# Patient Record
Sex: FEMALE | Race: BLACK OR AFRICAN AMERICAN | NOT HISPANIC OR LATINO | Employment: PART TIME | URBAN - METROPOLITAN AREA
[De-identification: names, ages, dates, MRNs, and addresses within clinical notes are randomized per-mention and may not be internally consistent; named-entity substitution may affect disease eponyms.]

---

## 2019-01-21 ENCOUNTER — OFFICE VISIT (OUTPATIENT)
Dept: URGENT CARE | Facility: URGENT CARE | Age: 24
End: 2019-01-21
Payer: COMMERCIAL

## 2019-01-21 VITALS
DIASTOLIC BLOOD PRESSURE: 70 MMHG | SYSTOLIC BLOOD PRESSURE: 128 MMHG | HEART RATE: 65 BPM | OXYGEN SATURATION: 98 % | TEMPERATURE: 98.1 F

## 2019-01-21 DIAGNOSIS — R10.13 ABDOMINAL PAIN, EPIGASTRIC: Primary | ICD-10-CM

## 2019-01-21 LAB
ALBUMIN SERPL-MCNC: 4.4 G/DL (ref 3.4–5)
ALP SERPL-CCNC: 56 U/L (ref 40–150)
ALT SERPL W P-5'-P-CCNC: 16 U/L (ref 0–50)
AMYLASE SERPL-CCNC: 56 U/L (ref 30–110)
ANION GAP SERPL CALCULATED.3IONS-SCNC: 4 MMOL/L (ref 3–14)
AST SERPL W P-5'-P-CCNC: 10 U/L (ref 0–45)
BASOPHILS # BLD AUTO: 0 10E9/L (ref 0–0.2)
BASOPHILS NFR BLD AUTO: 0.6 %
BILIRUB SERPL-MCNC: 0.6 MG/DL (ref 0.2–1.3)
BUN SERPL-MCNC: 9 MG/DL (ref 7–30)
CALCIUM SERPL-MCNC: 9.1 MG/DL (ref 8.5–10.1)
CHLORIDE SERPL-SCNC: 105 MMOL/L (ref 94–109)
CO2 SERPL-SCNC: 31 MMOL/L (ref 20–32)
CREAT SERPL-MCNC: 0.6 MG/DL (ref 0.52–1.04)
DIFFERENTIAL METHOD BLD: ABNORMAL
EOSINOPHIL # BLD AUTO: 0 10E9/L (ref 0–0.7)
EOSINOPHIL NFR BLD AUTO: 0.8 %
ERYTHROCYTE [DISTWIDTH] IN BLOOD BY AUTOMATED COUNT: 13.4 % (ref 10–15)
GFR SERPL CREATININE-BSD FRML MDRD: >90 ML/MIN/{1.73_M2}
GLUCOSE SERPL-MCNC: 83 MG/DL (ref 70–99)
HCT VFR BLD AUTO: 41.9 % (ref 35–47)
HGB BLD-MCNC: 14 G/DL (ref 11.7–15.7)
LYMPHOCYTES # BLD AUTO: 1.4 10E9/L (ref 0.8–5.3)
LYMPHOCYTES NFR BLD AUTO: 38.1 %
MCH RBC QN AUTO: 30.5 PG (ref 26.5–33)
MCHC RBC AUTO-ENTMCNC: 33.4 G/DL (ref 31.5–36.5)
MCV RBC AUTO: 91 FL (ref 78–100)
MONOCYTES # BLD AUTO: 0.3 10E9/L (ref 0–1.3)
MONOCYTES NFR BLD AUTO: 8.1 %
NEUTROPHILS # BLD AUTO: 1.9 10E9/L (ref 1.6–8.3)
NEUTROPHILS NFR BLD AUTO: 52.4 %
PLATELET # BLD AUTO: 230 10E9/L (ref 150–450)
POTASSIUM SERPL-SCNC: 4.3 MMOL/L (ref 3.4–5.3)
PROT SERPL-MCNC: 8.2 G/DL (ref 6.8–8.8)
RBC # BLD AUTO: 4.59 10E12/L (ref 3.8–5.2)
SODIUM SERPL-SCNC: 140 MMOL/L (ref 133–144)
WBC # BLD AUTO: 3.6 10E9/L (ref 4–11)

## 2019-01-21 PROCEDURE — 80053 COMPREHEN METABOLIC PANEL: CPT | Performed by: FAMILY MEDICINE

## 2019-01-21 PROCEDURE — 85025 COMPLETE CBC W/AUTO DIFF WBC: CPT | Performed by: FAMILY MEDICINE

## 2019-01-21 PROCEDURE — 36415 COLL VENOUS BLD VENIPUNCTURE: CPT | Performed by: FAMILY MEDICINE

## 2019-01-21 PROCEDURE — 99204 OFFICE O/P NEW MOD 45 MIN: CPT | Performed by: FAMILY MEDICINE

## 2019-01-21 PROCEDURE — 82150 ASSAY OF AMYLASE: CPT | Performed by: FAMILY MEDICINE

## 2019-01-21 RX ORDER — OMEPRAZOLE 40 MG/1
40 CAPSULE, DELAYED RELEASE ORAL DAILY PRN
Qty: 30 CAPSULE | Refills: 0 | Status: SHIPPED | OUTPATIENT
Start: 2019-01-21 | End: 2019-02-20

## 2019-01-21 NOTE — PROGRESS NOTES
SUBJECTIVE  HPI:  Fang Rodriguez is a 23 year old female who presents with the CC of abdominal/pelvic pain.    Pain is located in the epigastric area, without radiation to the back.  The pain is characterized as burning, and at worst is a level 7 on a scale of 1-10.  Pain has been present for 5 day(s) and is fluctuating.  EXACERBATING FACTORS: eating  RELIEVING FACTORS: nothing.  ASSOCIATED SX: weakness.   On metformin for pcos but not new med and no new doses or manuf, etc.     No fhx of cholecystitis, colitis    No prior surgeries    Non smoker    Past Medical History:   Diagnosis Date     PCOS (polycystic ovarian syndrome)      Current Outpatient Medications   Medication Sig Dispense Refill     metFORMIN (GLUCOPHAGE) 500 MG tablet Take 1,000 mg by mouth 2 times daily (with meals)       Social History     Tobacco Use     Smoking status: Not on file   Substance Use Topics     Alcohol use: Not on file     ROS:  CONSTITUTIONAL:NEGATIVE for fever, chills, change in weight  INTEGUMENTARY/SKIN: NEGATIVE for worrisome rashes, moles or lesions  EYES: NEGATIVE for vision changes or irritation  ENT/MOUTH: NEGATIVE for ear, mouth and throat problems  RESP:NEGATIVE for significant cough or SOB  BREAST: NEGATIVE for masses, tenderness or discharge  CV: NEGATIVE for chest pain, palpitations or peripheral edema  : normal menstrual cycles  MUSCULOSKELETAL: NEGATIVE for significant arthralgias or myalgia  NEURO: NEGATIVE for weakness, dizziness or paresthesias  ENDOCRINE: NEGATIVE for temperature intolerance, skin/hair changes  HEME/ALLERGY/IMMUNE: NEGATIVE for bleeding problems  PSYCHIATRIC: NEGATIVE for changes in mood or affect    OBJECTIVE:  /70   Pulse 65   Temp 98.1  F (36.7  C) (Oral)   SpO2 98%   GENERAL APPEARANCE: healthy, alert and no distress  EYES: EOMI,  PERRL, conjunctiva clear  HENT: ear canals and TM's normal.  Nose and mouth without ulcers, erythema or lesions  NECK: supple, nontender, no  lymphadenopathy  RESP: lungs clear to auscultation - no rales, rhonchi or wheezes  CV: regular rates and rhythm, normal S1 S2, no murmur noted  ABDOMEN:  soft, mildly tender, no HSM or masses and bowel sounds normal  NEURO: Normal strength and tone, sensory exam grossly normal,  normal speech and mentation  SKIN: no suspicious lesions or rashes    ASSESSMENT:  1. Abdominal pain, epigastric  ddx includes gb, pancreatitis, colitis, gastritis, ulcer.   Labs neg    - CBC with platelets and differential  - Comprehensive metabolic panel (BMP + Alb, Alk Phos, ALT, AST, Total. Bili, TP)  - Amylase  - omeprazole (PRILOSEC) 40 MG DR capsule; Take 1 capsule (40 mg) by mouth daily as needed (abd pain/gerd)  Dispense: 30 capsule; Refill: 0   See back in 2 days   Pt instructed to come back to the clinic for worsening sx  Avoid spicy, acidic foods  See Orders in Epic

## 2019-06-17 ENCOUNTER — OFFICE VISIT (OUTPATIENT)
Dept: URGENT CARE | Facility: URGENT CARE | Age: 24
End: 2019-06-17

## 2019-06-17 VITALS
HEART RATE: 69 BPM | OXYGEN SATURATION: 100 % | SYSTOLIC BLOOD PRESSURE: 131 MMHG | DIASTOLIC BLOOD PRESSURE: 73 MMHG | TEMPERATURE: 98.6 F

## 2019-06-17 DIAGNOSIS — R53.81 MALAISE: Primary | ICD-10-CM

## 2019-06-17 DIAGNOSIS — M79.10 MYALGIA: ICD-10-CM

## 2019-06-17 PROCEDURE — 99213 OFFICE O/P EST LOW 20 MIN: CPT

## 2019-06-17 NOTE — PROGRESS NOTES
Pt came here for weakness, neck pain and also headache. Has been going on for the last 1 week. Pt also noted that she has fever overnight but not sure how high is her temp. She has not been traveling outside the country. No other concern    No Known Allergies    Past Medical History:   Diagnosis Date     PCOS (polycystic ovarian syndrome)          Current Outpatient Medications on File Prior to Visit:  metFORMIN (GLUCOPHAGE) 500 MG tablet Take 1,000 mg by mouth 2 times daily (with meals)   [] omeprazole (PRILOSEC) 40 MG DR capsule Take 1 capsule (40 mg) by mouth daily as needed (abd pain/gerd)     No current facility-administered medications on file prior to visit.     Social History     Tobacco Use     Smoking status: Not on file   Substance Use Topics     Alcohol use: Not on file       ROS:  12 point ROS is done and aside that mention above all other review of system is negative    OBJECTIVE:  /73   Pulse 69   Temp 98.6  F (37  C) (Oral)   SpO2 100%   GENERAL APPEARANCE: healthy, alert and moderate distress  EYES: conjunctiva clear  EARS:no cerumen.   Ear canals no erythema, TM's intact no erythema .    NOSE/MOUTH: Nose and mouth is normal, no erythema or lesions  THROAT: no erythema w/ no tonsillar enlargement . no exudates  NECK: supple, nontender, no lymphadenopathy  RESP: lungs clear to auscultation - no rales, rhonchi or wheezes  CV: regular rates and rhythm, normal S1 S2, no murmur noted  NEURO: awake, alert        No results found for this or any previous visit (from the past 168 hour(s)).     ASSESSMENT:     ICD-10-CM    1. Malaise R53.81    2. Myalgia M79.10          PLAN:    At this time I am not sure the cause of her symptom. Unfortunately I will not be able to do complete blood test in urgent care. This can also be viral issue. Recommend to try Tylenol and ibuprofen prn for  Pain or fever. If not improve will need further eval with pcp    Naren Gomez MD

## 2020-08-23 ENCOUNTER — HOSPITAL ENCOUNTER (EMERGENCY)
Facility: CLINIC | Age: 25
Discharge: HOME | End: 2020-08-23
Attending: EMERGENCY MEDICINE | Admitting: EMERGENCY MEDICINE

## 2020-08-23 ENCOUNTER — APPOINTMENT (OUTPATIENT)
Dept: ULTRASOUND IMAGING | Facility: CLINIC | Age: 25
End: 2020-08-23
Attending: EMERGENCY MEDICINE

## 2020-08-23 VITALS
OXYGEN SATURATION: 99 % | RESPIRATION RATE: 16 BRPM | TEMPERATURE: 97.3 F | SYSTOLIC BLOOD PRESSURE: 142 MMHG | BODY MASS INDEX: 26.64 KG/M2 | WEIGHT: 169.75 LBS | DIASTOLIC BLOOD PRESSURE: 80 MMHG | HEART RATE: 75 BPM | HEIGHT: 67 IN

## 2020-08-23 DIAGNOSIS — O20.0 THREATENED MISCARRIAGE: ICD-10-CM

## 2020-08-23 DIAGNOSIS — O30.001 TWIN GESTATION IN FIRST TRIMESTER, UNSPECIFIED MULTIPLE GESTATION TYPE: ICD-10-CM

## 2020-08-23 LAB
ABO + RH BLD: NORMAL
ABO + RH BLD: NORMAL
ALBUMIN SERPL-MCNC: 3.3 G/DL (ref 3.4–5)
ALP SERPL-CCNC: 62 U/L (ref 40–150)
ALT SERPL W P-5'-P-CCNC: 20 U/L (ref 0–50)
ANION GAP SERPL CALCULATED.3IONS-SCNC: 5 MMOL/L (ref 3–14)
APTT PPP: 30 SEC (ref 22–37)
AST SERPL W P-5'-P-CCNC: 12 U/L (ref 0–45)
B-HCG SERPL-ACNC: ABNORMAL IU/L (ref 0–5)
BASOPHILS # BLD AUTO: 0 10E9/L (ref 0–0.2)
BASOPHILS NFR BLD AUTO: 0.2 %
BILIRUB SERPL-MCNC: 0.7 MG/DL (ref 0.2–1.3)
BLD GP AB SCN SERPL QL: NORMAL
BLOOD BANK CMNT PATIENT-IMP: NORMAL
BUN SERPL-MCNC: 5 MG/DL (ref 7–30)
CALCIUM SERPL-MCNC: 8 MG/DL (ref 8.5–10.1)
CHLORIDE SERPL-SCNC: 108 MMOL/L (ref 94–109)
CO2 SERPL-SCNC: 23 MMOL/L (ref 20–32)
CREAT SERPL-MCNC: 0.56 MG/DL (ref 0.52–1.04)
DIFFERENTIAL METHOD BLD: NORMAL
EOSINOPHIL # BLD AUTO: 0.1 10E9/L (ref 0–0.7)
EOSINOPHIL NFR BLD AUTO: 1.8 %
ERYTHROCYTE [DISTWIDTH] IN BLOOD BY AUTOMATED COUNT: 13.1 % (ref 10–15)
GFR SERPL CREATININE-BSD FRML MDRD: >90 ML/MIN/{1.73_M2}
GLUCOSE SERPL-MCNC: 100 MG/DL (ref 70–99)
HCT VFR BLD AUTO: 39.4 % (ref 35–47)
HGB BLD-MCNC: 13.1 G/DL (ref 11.7–15.7)
IMM GRANULOCYTES # BLD: 0 10E9/L (ref 0–0.4)
IMM GRANULOCYTES NFR BLD: 0.4 %
INR PPP: 0.99 (ref 0.86–1.14)
LYMPHOCYTES # BLD AUTO: 1.2 10E9/L (ref 0.8–5.3)
LYMPHOCYTES NFR BLD AUTO: 20.8 %
MCH RBC QN AUTO: 30.3 PG (ref 26.5–33)
MCHC RBC AUTO-ENTMCNC: 33.2 G/DL (ref 31.5–36.5)
MCV RBC AUTO: 91 FL (ref 78–100)
MONOCYTES # BLD AUTO: 0.4 10E9/L (ref 0–1.3)
MONOCYTES NFR BLD AUTO: 7.8 %
NEUTROPHILS # BLD AUTO: 3.9 10E9/L (ref 1.6–8.3)
NEUTROPHILS NFR BLD AUTO: 69 %
NRBC # BLD AUTO: 0 10*3/UL
NRBC BLD AUTO-RTO: 0 /100
PLATELET # BLD AUTO: 227 10E9/L (ref 150–450)
POTASSIUM SERPL-SCNC: 3.7 MMOL/L (ref 3.4–5.3)
PROT SERPL-MCNC: 7.1 G/DL (ref 6.8–8.8)
RBC # BLD AUTO: 4.32 10E12/L (ref 3.8–5.2)
SODIUM SERPL-SCNC: 136 MMOL/L (ref 133–144)
SPECIMEN EXP DATE BLD: NORMAL
WBC # BLD AUTO: 5.7 10E9/L (ref 4–11)

## 2020-08-23 PROCEDURE — 99284 EMERGENCY DEPT VISIT MOD MDM: CPT | Mod: 25

## 2020-08-23 PROCEDURE — 85730 THROMBOPLASTIN TIME PARTIAL: CPT | Performed by: EMERGENCY MEDICINE

## 2020-08-23 PROCEDURE — 84702 CHORIONIC GONADOTROPIN TEST: CPT | Performed by: EMERGENCY MEDICINE

## 2020-08-23 PROCEDURE — 86900 BLOOD TYPING SEROLOGIC ABO: CPT | Performed by: EMERGENCY MEDICINE

## 2020-08-23 PROCEDURE — 85610 PROTHROMBIN TIME: CPT | Performed by: EMERGENCY MEDICINE

## 2020-08-23 PROCEDURE — 76817 TRANSVAGINAL US OBSTETRIC: CPT

## 2020-08-23 PROCEDURE — 86901 BLOOD TYPING SEROLOGIC RH(D): CPT | Performed by: EMERGENCY MEDICINE

## 2020-08-23 PROCEDURE — 80053 COMPREHEN METABOLIC PANEL: CPT | Performed by: EMERGENCY MEDICINE

## 2020-08-23 PROCEDURE — 85025 COMPLETE CBC W/AUTO DIFF WBC: CPT | Performed by: EMERGENCY MEDICINE

## 2020-08-23 PROCEDURE — 86850 RBC ANTIBODY SCREEN: CPT | Performed by: EMERGENCY MEDICINE

## 2020-08-23 ASSESSMENT — ENCOUNTER SYMPTOMS
FEVER: 0
CHILLS: 0
DIFFICULTY URINATING: 0
SLEEP DISTURBANCE: 1
COUGH: 0
DYSURIA: 0
FREQUENCY: 0
VOMITING: 0
ABDOMINAL PAIN: 0
SHORTNESS OF BREATH: 0
DIARRHEA: 0
NAUSEA: 0

## 2020-08-23 ASSESSMENT — MIFFLIN-ST. JEOR: SCORE: 1537.63

## 2020-08-23 NOTE — ED AVS SNAPSHOT
Emergency Department  [address] [place] [id number]  Phone:  [phone]  Fax:  [phone]                                    Fang Rodriguez   MRN: [id number]    Department:   Emergency Department   Date of Visit:  8/23/2020           After Visit Summary Signature Page    I have received my discharge instructions, and my questions have been answered. I have discussed any challenges I see with this plan with the nurse or doctor.    ..........................................................................................................................................  Patient/Patient Representative Signature      ..........................................................................................................................................  Patient Representative Print Name and Relationship to Patient    ..................................................               ................................................  Date                                   Time    ..........................................................................................................................................  Reviewed by Signature/Title    ...................................................              ..............................................  Date                                               Time          22EPIC Rev 08/18

## 2020-08-23 NOTE — ED PROVIDER NOTES
History     Chief Complaint:  Vaginal Bleeding     The history is provided by the patient.     Fang Rodriguez is a 25 year old year old female, believed to be about 6 weeks pregnant, with a history of PCOS who presents with her significant other for evaluation of vaginal bleeding for the last three days. At first, the patient reports that it started out as brown spotting. She had contacted her OB and they said that this was normal and should resolve on its own. That night, the spotting had just about stopped. Two days ago, the patient noticed the spotting again, but this time it was pink in color. She once again called her OB who said she should not be too concerned and that it would go away. Patient noticed an improvement in her spotting yesterday during the day. Last night, however, the pink spotting returned. This morning, about 4 hours prior to arrival, the patient was awoken out of sleep with pink spotting as well as what looked like the passing of some tissue. She then also had some pink watery discharge. Patient denies any pain or cramping but notes an occasional twinge of pain.    Here, the patient states she continues to spot but noes it is not as heavy as a normal period.     ISTAT HCG Quantitative Pregnancy from 08/17/2020: 16,576 (H)    HCG Quantitative Pregnancy from 08/10/2020: 1,096 (H)    Allergies:  No Known Allergies    Medications:   Metformin    Medical History:   PCOS    Surgical History   The patient does not have any pertinent past surgical history.    Family History:   No past pertinent family history.    Social History:  Presents to the ED with her significant other.  Tobacco Use: Negative  Alcohol Use: Negative  Drug Use: Negative    Review of Systems   Constitutional: Negative for chills and fever.   Respiratory: Negative for cough and shortness of breath.    Cardiovascular: Negative for chest pain.   Gastrointestinal: Negative for abdominal pain, diarrhea, nausea and vomiting.  "  Genitourinary: Positive for vaginal bleeding and vaginal discharge. Negative for difficulty urinating, dysuria, frequency and pelvic pain.   Psychiatric/Behavioral: Positive for sleep disturbance (secondary to bleeding).   All other systems reviewed and are negative.      Physical Exam     Patient Vitals for the past 24 hrs:   BP Temp Temp src Pulse Resp SpO2 Height Weight   08/23/20 0549 (!) 155/88 97.3  F (36.3  C) Temporal 79 16 98 % -- --   08/23/20 0548 (!) 155/88 -- Temporal 79 14 98 % 1.702 m (5' 7\") 77 kg (169 lb 12.1 oz)       Physical Exam  Vitals: reviewed by me  General: Pt seen on hospital [facility], pleasant, cooperative, and alert to conversation  Eyes: Tracking well, clear conjunctiva BL  ENT: MMM, midline trachea.   Lungs:   No tachypnea, no accessory muscle use. No respiratory distress.   CV: Rate as above, regular rhythm.    Abd: Soft, non tender, no guarding, no rebound. Non distended  MSK: no peripheral edema or joint effusion.  No evidence of trauma  Skin: No rash, normal turgor and temperature  Neuro: Clear speech and no facial droop.  Psych: Not RIS, no e/o AH/VH      Emergency Department Course   Imaging:  Radiology findings were communicated with the patient and family who voiced understanding of the findings.    OB US 1st Trimester with Transvaginal:  1.  Twin living intrauterine gestation at 6 weeks 2 days with EDC 4/16/2021. Cardiac activity for fetus A and fetus B both at 126 bpm.  2.  Multiple uterine fibroids with the largest measuring 3.5 x 3 x 2.9 cm.  3.  No overt ovarian abnormality. As per radiology.    Laboratory:  Laboratory findings were communicated with the patient and family who voiced understanding of the findings.    CBC: AWNL; WBC: 5.7, HGB: 13.1, PLT: 227  CMP: Glucose: 100 (H), Urea Nitrogen: 5 (L), Calcium: 8.0 (L), Albumin: 3.3 (L), o/w WNL (Creatinine: 0.56)    INR: 0.99  PTT: 30    HCG Quantitative Pregnancy: 49,752 (H)    ABO/Rh Type and Screen: O Positive, " Negative for Antibodies    Emergency Department Course:  Past medical records, nursing notes, and vitals reviewed.    5:59 AM I performed an exam of the patient as documented above.     IV was inserted and blood was drawn for laboratory testing, results above.    The patient was sent for a 1st trimester US while in the emergency department, results above.     0752 I consulted with Dr. Behrns, radiology, regarding the patient's history and presentation here in the emergency department.    0754 I rechecked the patient and discussed the results of her workup thus far.     Findings and plan explained to the Patient and significant other. Patient discharged home with instructions regarding supportive care, medications, and reasons to return. The importance of close follow-up was reviewed.     I personally reviewed the laboratory and imaging results with the Patient and significant other and answered all related questions prior to discharge.     Impression & Plan   Medical Decision Making:  Fang Rodriguez is a very pleasant 25 year old female who presents today with what appears to be a twin gestation with threatened miscarriage. The heart rate of both twins appears to be within normal limits, and there is no clear cause of her bleeding, though it may have something to do with her fibroids that were found today. She has clear IUP confirmed, no abdominal pain on my exam, and a normal hemoglobin. She is Rh positive. I think she is stable to see her OB-GYN in 48 hours and she tells me that she already has an appointment made for Tuesday (in two days). Will plan for discharge home with very clear return to ED precautions as well as follow up with OB as above.    Diagnosis:    ICD-10-CM    1. Twin gestation in first trimester, unspecified multiple gestation type  O30.001    2. Threatened miscarriage  O20.0        Disposition:  Discharged to home.    Scribe Disclosure:  Su KRISHNAMURTHY, am serving as a scribe on 8/23/2020 at 6:00  AM to personally document services performed by Kristian Morin MD, based on my observations and the provider's statements to me.      Kristian Morin MD  08/23/20 4051

## 2020-08-23 NOTE — DISCHARGE INSTRUCTIONS
We have no evidence that you are in the miscarriage or that you are likely to miscarriage, we simply call any vaginal bleeding in the first trimester a threatened miscarriage.  Everything looks healthy on the ultrasound, apart from your fibroids, please do see your regular OB/GYN in the next 48 hours as we discussed.

## 2020-12-17 ENCOUNTER — HOSPITAL PATHOLOGY (OUTPATIENT)
Dept: OTHER | Facility: CLINIC | Age: 25
End: 2020-12-17

## 2020-12-21 LAB — COPATH REPORT: NORMAL

## 2021-01-03 ENCOUNTER — HEALTH MAINTENANCE LETTER (OUTPATIENT)
Age: 26
End: 2021-01-03

## 2021-02-26 ENCOUNTER — HOSPITAL ENCOUNTER (OUTPATIENT)
Dept: LAB | Facility: CLINIC | Age: 26
Discharge: HOME | End: 2021-02-26
Admitting: OBSTETRICS & GYNECOLOGY
Payer: COMMERCIAL

## 2021-02-26 DIAGNOSIS — E28.9 DISORDER OF ENDOCRINE OVARY: Primary | ICD-10-CM

## 2021-02-26 DIAGNOSIS — Z31.41 FERTILITY TESTING: ICD-10-CM

## 2021-02-26 LAB
ESTRADIOL SERPL-MCNC: 240 PG/ML
LH SERPL-ACNC: 8.9 IU/L
PROGEST SERPL-MCNC: <0.2 NG/ML
PROLACTIN SERPL-MCNC: 15 UG/L (ref 3–27)

## 2021-02-26 PROCEDURE — 36415 COLL VENOUS BLD VENIPUNCTURE: CPT | Performed by: OBSTETRICS & GYNECOLOGY

## 2021-02-26 PROCEDURE — 84144 ASSAY OF PROGESTERONE: CPT | Performed by: OBSTETRICS & GYNECOLOGY

## 2021-02-26 PROCEDURE — 84146 ASSAY OF PROLACTIN: CPT | Performed by: OBSTETRICS & GYNECOLOGY

## 2021-02-26 PROCEDURE — 82670 ASSAY OF TOTAL ESTRADIOL: CPT | Performed by: OBSTETRICS & GYNECOLOGY

## 2021-02-26 PROCEDURE — 83002 ASSAY OF GONADOTROPIN (LH): CPT | Performed by: OBSTETRICS & GYNECOLOGY

## 2021-02-26 PROCEDURE — 86762 RUBELLA ANTIBODY: CPT | Performed by: OBSTETRICS & GYNECOLOGY

## 2021-03-01 LAB — RUBV IGG SERPL IA-ACNC: >250 IU/ML

## 2021-03-12 ENCOUNTER — HOSPITAL ENCOUNTER (OUTPATIENT)
Dept: LAB | Facility: CLINIC | Age: 26
Discharge: HOME | End: 2021-03-12
Attending: OBSTETRICS & GYNECOLOGY | Admitting: OBSTETRICS & GYNECOLOGY
Payer: COMMERCIAL

## 2021-03-12 DIAGNOSIS — E28.9 DISORDER OF ENDOCRINE OVARY: Primary | ICD-10-CM

## 2021-03-12 LAB — PROGEST SERPL-MCNC: 28.8 NG/ML

## 2021-03-12 PROCEDURE — 84144 ASSAY OF PROGESTERONE: CPT | Performed by: OBSTETRICS & GYNECOLOGY

## 2021-03-12 PROCEDURE — 36415 COLL VENOUS BLD VENIPUNCTURE: CPT | Performed by: OBSTETRICS & GYNECOLOGY

## 2021-03-12 PROCEDURE — 82670 ASSAY OF TOTAL ESTRADIOL: CPT | Performed by: OBSTETRICS & GYNECOLOGY

## 2021-03-13 LAB — ESTRADIOL SERPL-MCNC: 571 PG/ML

## 2021-03-19 ENCOUNTER — HOSPITAL ENCOUNTER (OUTPATIENT)
Dept: LAB | Facility: CLINIC | Age: 26
Discharge: HOME | End: 2021-03-19
Attending: OBSTETRICS & GYNECOLOGY | Admitting: OBSTETRICS & GYNECOLOGY
Payer: COMMERCIAL

## 2021-03-19 DIAGNOSIS — Z32.01 PREGNANCY EXAMINATION OR TEST, POSITIVE RESULT: Primary | ICD-10-CM

## 2021-03-19 LAB
B-HCG SERPL-ACNC: 58 IU/L (ref 0–5)
ESTRADIOL SERPL-MCNC: 987 PG/ML
PROGEST SERPL-MCNC: 38.7 NG/ML
TSH SERPL DL<=0.005 MIU/L-ACNC: 2.32 MU/L (ref 0.4–4)

## 2021-03-19 PROCEDURE — 82670 ASSAY OF TOTAL ESTRADIOL: CPT | Performed by: OBSTETRICS & GYNECOLOGY

## 2021-03-19 PROCEDURE — 84702 CHORIONIC GONADOTROPIN TEST: CPT | Performed by: OBSTETRICS & GYNECOLOGY

## 2021-03-19 PROCEDURE — 84443 ASSAY THYROID STIM HORMONE: CPT | Performed by: OBSTETRICS & GYNECOLOGY

## 2021-03-19 PROCEDURE — 84144 ASSAY OF PROGESTERONE: CPT | Performed by: OBSTETRICS & GYNECOLOGY

## 2021-03-19 PROCEDURE — 36415 COLL VENOUS BLD VENIPUNCTURE: CPT | Performed by: OBSTETRICS & GYNECOLOGY

## 2021-04-05 ENCOUNTER — MEDICAL CORRESPONDENCE (OUTPATIENT)
Dept: HEALTH INFORMATION MANAGEMENT | Facility: CLINIC | Age: 26
End: 2021-04-05

## 2021-04-05 ENCOUNTER — HOSPITAL ENCOUNTER (OUTPATIENT)
Dept: LAB | Facility: CLINIC | Age: 26
Discharge: HOME | End: 2021-04-05
Admitting: OBSTETRICS & GYNECOLOGY
Payer: COMMERCIAL

## 2021-04-05 DIAGNOSIS — Z32.01 PREGNANCY EXAMINATION OR TEST, POSITIVE RESULT: Primary | ICD-10-CM

## 2021-04-05 LAB
B-HCG SERPL-ACNC: ABNORMAL IU/L (ref 0–5)
ESTRADIOL SERPL-MCNC: 1402 PG/ML
PROGEST SERPL-MCNC: 58.4 NG/ML

## 2021-04-05 PROCEDURE — 36415 COLL VENOUS BLD VENIPUNCTURE: CPT | Performed by: OBSTETRICS & GYNECOLOGY

## 2021-04-05 PROCEDURE — 84144 ASSAY OF PROGESTERONE: CPT | Performed by: OBSTETRICS & GYNECOLOGY

## 2021-04-05 PROCEDURE — 84702 CHORIONIC GONADOTROPIN TEST: CPT | Performed by: OBSTETRICS & GYNECOLOGY

## 2021-04-05 PROCEDURE — 82670 ASSAY OF TOTAL ESTRADIOL: CPT | Performed by: OBSTETRICS & GYNECOLOGY

## 2021-04-09 ENCOUNTER — MEDICAL CORRESPONDENCE (OUTPATIENT)
Dept: HEALTH INFORMATION MANAGEMENT | Facility: CLINIC | Age: 26
End: 2021-04-09

## 2021-04-09 ENCOUNTER — HOSPITAL ENCOUNTER (OUTPATIENT)
Dept: LAB | Facility: CLINIC | Age: 26
Discharge: HOME | End: 2021-04-09
Attending: OBSTETRICS & GYNECOLOGY | Admitting: OBSTETRICS & GYNECOLOGY
Payer: COMMERCIAL

## 2021-04-09 DIAGNOSIS — E28.9 OVARIAN DYSFUNCTION: Primary | ICD-10-CM

## 2021-04-09 LAB
B-HCG SERPL-ACNC: ABNORMAL IU/L (ref 0–5)
ESTRADIOL SERPL-MCNC: 338 PG/ML
PROGEST SERPL-MCNC: 36.2 NG/ML

## 2021-04-09 PROCEDURE — 82670 ASSAY OF TOTAL ESTRADIOL: CPT | Performed by: OBSTETRICS & GYNECOLOGY

## 2021-04-09 PROCEDURE — 84144 ASSAY OF PROGESTERONE: CPT | Performed by: OBSTETRICS & GYNECOLOGY

## 2021-04-09 PROCEDURE — 84702 CHORIONIC GONADOTROPIN TEST: CPT | Performed by: OBSTETRICS & GYNECOLOGY

## 2021-04-09 PROCEDURE — 36415 COLL VENOUS BLD VENIPUNCTURE: CPT | Performed by: OBSTETRICS & GYNECOLOGY

## 2021-04-15 ENCOUNTER — HOSPITAL PATHOLOGY (OUTPATIENT)
Dept: OTHER | Facility: CLINIC | Age: 26
End: 2021-04-15

## 2021-04-18 LAB — COPATH REPORT: NORMAL

## 2021-05-17 ENCOUNTER — HOSPITAL LABORATORY (OUTPATIENT)
Dept: OTHER | Facility: CLINIC | Age: 26
End: 2021-05-17

## 2021-05-17 ENCOUNTER — HOSPITAL PATHOLOGY (OUTPATIENT)
Dept: OTHER | Facility: CLINIC | Age: 26
End: 2021-05-17

## 2021-05-25 LAB
DNA INDEX SPEC FC-ACNC: 1
DNA PLOIDY SPEC FC: NORMAL
PATHOLOGY STUDY: NORMAL
SPECIMEN SOURCE: NORMAL

## 2021-06-09 LAB — COPATH REPORT: NORMAL

## 2021-10-10 ENCOUNTER — HEALTH MAINTENANCE LETTER (OUTPATIENT)
Age: 26
End: 2021-10-10

## 2022-01-29 ENCOUNTER — HEALTH MAINTENANCE LETTER (OUTPATIENT)
Age: 27
End: 2022-01-29

## 2022-09-18 ENCOUNTER — HEALTH MAINTENANCE LETTER (OUTPATIENT)
Age: 27
End: 2022-09-18

## 2022-09-19 ENCOUNTER — APPOINTMENT (OUTPATIENT)
Dept: ULTRASOUND IMAGING | Facility: CLINIC | Age: 27
End: 2022-09-19
Attending: EMERGENCY MEDICINE
Payer: COMMERCIAL

## 2022-09-19 ENCOUNTER — HOSPITAL ENCOUNTER (EMERGENCY)
Facility: CLINIC | Age: 27
Discharge: HOME | End: 2022-09-19
Attending: EMERGENCY MEDICINE | Admitting: EMERGENCY MEDICINE
Payer: COMMERCIAL

## 2022-09-19 VITALS
RESPIRATION RATE: 16 BRPM | HEIGHT: 67 IN | DIASTOLIC BLOOD PRESSURE: 63 MMHG | OXYGEN SATURATION: 100 % | HEART RATE: 75 BPM | BODY MASS INDEX: 27.15 KG/M2 | SYSTOLIC BLOOD PRESSURE: 128 MMHG | TEMPERATURE: 97.9 F | WEIGHT: 173 LBS

## 2022-09-19 DIAGNOSIS — O20.0 PREGNANCY WITH EARLY THREATENED ABORTION: ICD-10-CM

## 2022-09-19 DIAGNOSIS — R51.9 ACUTE NONINTRACTABLE HEADACHE, UNSPECIFIED HEADACHE TYPE: ICD-10-CM

## 2022-09-19 LAB
ANION GAP SERPL CALCULATED.3IONS-SCNC: 7 MMOL/L (ref 3–14)
B-HCG SERPL-ACNC: ABNORMAL IU/L (ref 0–5)
BASOPHILS # BLD AUTO: 0 10E3/UL (ref 0–0.2)
BASOPHILS NFR BLD AUTO: 1 %
BUN SERPL-MCNC: 6 MG/DL (ref 7–30)
CALCIUM SERPL-MCNC: 8.5 MG/DL (ref 8.5–10.1)
CHLORIDE BLD-SCNC: 109 MMOL/L (ref 94–109)
CO2 SERPL-SCNC: 20 MMOL/L (ref 20–32)
CREAT SERPL-MCNC: 0.63 MG/DL (ref 0.52–1.04)
EOSINOPHIL # BLD AUTO: 0 10E3/UL (ref 0–0.7)
EOSINOPHIL NFR BLD AUTO: 1 %
ERYTHROCYTE [DISTWIDTH] IN BLOOD BY AUTOMATED COUNT: 12.7 % (ref 10–15)
FLUAV RNA SPEC QL NAA+PROBE: NEGATIVE
FLUBV RNA RESP QL NAA+PROBE: NEGATIVE
GFR SERPL CREATININE-BSD FRML MDRD: >90 ML/MIN/1.73M2
GLUCOSE BLD-MCNC: 101 MG/DL (ref 70–99)
HCT VFR BLD AUTO: 36.2 % (ref 35–47)
HGB BLD-MCNC: 12.6 G/DL (ref 11.7–15.7)
IMM GRANULOCYTES # BLD: 0 10E3/UL
IMM GRANULOCYTES NFR BLD: 0 %
LYMPHOCYTES # BLD AUTO: 1.3 10E3/UL (ref 0.8–5.3)
LYMPHOCYTES NFR BLD AUTO: 36 %
MCH RBC QN AUTO: 30.2 PG (ref 26.5–33)
MCHC RBC AUTO-ENTMCNC: 34.8 G/DL (ref 31.5–36.5)
MCV RBC AUTO: 87 FL (ref 78–100)
MONOCYTES # BLD AUTO: 0.3 10E3/UL (ref 0–1.3)
MONOCYTES NFR BLD AUTO: 8 %
NEUTROPHILS # BLD AUTO: 2 10E3/UL (ref 1.6–8.3)
NEUTROPHILS NFR BLD AUTO: 54 %
NRBC # BLD AUTO: 0 10E3/UL
NRBC BLD AUTO-RTO: 0 /100
PLATELET # BLD AUTO: 210 10E3/UL (ref 150–450)
POTASSIUM BLD-SCNC: 3.8 MMOL/L (ref 3.4–5.3)
RBC # BLD AUTO: 4.17 10E6/UL (ref 3.8–5.2)
RSV RNA SPEC NAA+PROBE: NEGATIVE
SARS-COV-2 RNA RESP QL NAA+PROBE: NEGATIVE
SODIUM SERPL-SCNC: 136 MMOL/L (ref 133–144)
WBC # BLD AUTO: 3.7 10E3/UL (ref 4–11)

## 2022-09-19 PROCEDURE — 84702 CHORIONIC GONADOTROPIN TEST: CPT | Performed by: EMERGENCY MEDICINE

## 2022-09-19 PROCEDURE — 250N000011 HC RX IP 250 OP 636: Performed by: EMERGENCY MEDICINE

## 2022-09-19 PROCEDURE — 96374 THER/PROPH/DIAG INJ IV PUSH: CPT

## 2022-09-19 PROCEDURE — 85025 COMPLETE CBC W/AUTO DIFF WBC: CPT | Performed by: EMERGENCY MEDICINE

## 2022-09-19 PROCEDURE — 36415 COLL VENOUS BLD VENIPUNCTURE: CPT | Performed by: EMERGENCY MEDICINE

## 2022-09-19 PROCEDURE — C9803 HOPD COVID-19 SPEC COLLECT: HCPCS

## 2022-09-19 PROCEDURE — 250N000013 HC RX MED GY IP 250 OP 250 PS 637: Performed by: EMERGENCY MEDICINE

## 2022-09-19 PROCEDURE — 87637 SARSCOV2&INF A&B&RSV AMP PRB: CPT | Performed by: EMERGENCY MEDICINE

## 2022-09-19 PROCEDURE — 96361 HYDRATE IV INFUSION ADD-ON: CPT

## 2022-09-19 PROCEDURE — 82310 ASSAY OF CALCIUM: CPT | Performed by: EMERGENCY MEDICINE

## 2022-09-19 PROCEDURE — 99285 EMERGENCY DEPT VISIT HI MDM: CPT | Mod: 25

## 2022-09-19 PROCEDURE — 96375 TX/PRO/DX INJ NEW DRUG ADDON: CPT

## 2022-09-19 PROCEDURE — 76801 OB US < 14 WKS SINGLE FETUS: CPT

## 2022-09-19 PROCEDURE — 258N000003 HC RX IP 258 OP 636: Performed by: EMERGENCY MEDICINE

## 2022-09-19 RX ORDER — KETOROLAC TROMETHAMINE 15 MG/ML
15 INJECTION, SOLUTION INTRAMUSCULAR; INTRAVENOUS ONCE
Status: DISCONTINUED | OUTPATIENT
Start: 2022-09-19 | End: 2022-09-19

## 2022-09-19 RX ORDER — DIPHENHYDRAMINE HYDROCHLORIDE 50 MG/ML
12.5 INJECTION INTRAMUSCULAR; INTRAVENOUS ONCE
Status: COMPLETED | OUTPATIENT
Start: 2022-09-19 | End: 2022-09-19

## 2022-09-19 RX ORDER — METOCLOPRAMIDE HYDROCHLORIDE 5 MG/ML
10 INJECTION INTRAMUSCULAR; INTRAVENOUS ONCE
Status: COMPLETED | OUTPATIENT
Start: 2022-09-19 | End: 2022-09-19

## 2022-09-19 RX ORDER — ACETAMINOPHEN 325 MG/1
975 TABLET ORAL ONCE
Status: COMPLETED | OUTPATIENT
Start: 2022-09-19 | End: 2022-09-19

## 2022-09-19 RX ADMIN — DIPHENHYDRAMINE HYDROCHLORIDE 12.5 MG: 50 INJECTION, SOLUTION INTRAMUSCULAR; INTRAVENOUS at 09:00

## 2022-09-19 RX ADMIN — METOCLOPRAMIDE 10 MG: 5 INJECTION, SOLUTION INTRAMUSCULAR; INTRAVENOUS at 09:00

## 2022-09-19 RX ADMIN — ACETAMINOPHEN 975 MG: 325 TABLET ORAL at 09:00

## 2022-09-19 RX ADMIN — SODIUM CHLORIDE 1000 ML: 9 INJECTION, SOLUTION INTRAVENOUS at 09:00

## 2022-09-19 ASSESSMENT — ACTIVITIES OF DAILY LIVING (ADL)
ADLS_ACUITY_SCORE: 35
ADLS_ACUITY_SCORE: 35

## 2022-09-19 NOTE — ED NOTES
Patient ambulated to the bathroom, she reports improvement in her headache.    She may change to Zaroxolyn every other day.  Take 30 minutes prior to Lasix.  Update metabolic panel in 4 weeks.

## 2022-09-19 NOTE — ED TRIAGE NOTES
Patient comes in with a frontal headache that is behind her eyes.  Has been going on since Friday.  Light sensitivity, no dizziness or numbness.  Patient states she has not had a headache like this previously.  Positive pregnancy test at home 2 weeks ago.      Triage Assessment     Row Name 09/19/22 0841       Triage Assessment (Adult)    Airway WDL WDL       Respiratory WDL    Respiratory WDL WDL       Skin Circulation/Temperature WDL    Skin Circulation/Temperature WDL WDL       Cardiac WDL    Cardiac WDL WDL       Peripheral/Neurovascular WDL    Peripheral Neurovascular WDL WDL       Cognitive/Neuro/Behavioral WDL    Cognitive/Neuro/Behavioral WDL --  frontal headache and light sensitivity

## 2022-09-19 NOTE — ED PROVIDER NOTES
History     Chief Complaint:  Headache       HPI   Fang Rodriguez is a 27 year old female who presents with headache for the last 3 days.  Patient states it starts in her forehead and moves to the back of her head.  She states she took 1 dose of Tylenol that did not resolve the headache therefore she stopped taking it and has not tried anything else.  She has had headaches similar to this in the past but not that have lasted this long.  She does state that her last menstrual period was .  She is a .  She denies headaches with previous pregnancies.  She states she has had some brownish discharge when wiping and some cramping pain.  She stopped prenatals as a result of this.  She has had some alternating hot and cold flashes that she thought was likely hormone related.  She denies nausea or vomiting.  She denies dysuria, urgency or frequency.  Patient states no recent fevers.  Patient denies sick contacts.  No diarrhea.  No true vaginal bleeding that she is noted.  She has not yet seen her OB/GYN for this pregnancy.  Patient denies neck or back pain.  She does endorse light sensitivity and noise sensitivity.    ROS:  Review of Systems  Constitutional: Alternating hot and cold  Neuro: Headache, no numbness or weakness, no neck pain or stiffness  GI: No nausea, vomiting, diarrhea  : Brown discharge with wiping but no vaginal bleeding.  Remainder systems reviewed and negative    Allergies:  No Known Allergies     Medications:    metFORMIN (GLUCOPHAGE) 500 MG tablet        Past Medical History:    Past Medical History:   Diagnosis Date     PCOS (polycystic ovarian syndrome)        Past Surgical History:    No past surgical history on file.     Family History:    family history includes No Known Problems in her father and mother.    Social History:     PCP: [place] [facility]     Physical Exam     Patient Vitals for the past 24 hrs:   BP Temp Temp src Pulse Resp SpO2 Height Weight   22  "0838 128/63 97.9  F (36.6  C) Temporal 75 16 100 % 1.702 m (5' 7\") 78.5 kg (173 lb)        Physical Exam  General: Patient is alert and normal appearing.  HEENT: Head atraumatic    Eyes: pupils equal and reactive. Conjunctiva clear   Nares: patent   Oropharynx: no lesions, uvula midline, no palatal draping, normal voice, no trismus  Neck: Supple without lymphadenopathy, no meningismus  Chest: Heart regular rate and rhythm.   Lungs: Equal clear to auscultation with no wheeze or rales  Abdomen: Soft, non tender, nondistended, normal bowel sounds  Back: No costovertebral angle tenderness, no midline C, T or L spine tenderness  Neuro: Grossly nonfocal, normal speech, strength equal bilaterally, CN 2-12 intact  Extremities: No deformities, equal radial and DP pulses. No clubbing, cyanosis.  No edema  Skin: Warm and dry with no rash.       Emergency Department Course     Imaging:  US OB <14 Weeks W Transvaginal   Preliminary Result   IMPRESSION: Cystic structure appears lobulated and somewhat compressed   at the endometrium and could be a gestational sac. No fetal pole or   yolk sac an be seen. Assuming that this is a gestational sac,   gestational age measures 5 weeks 6 days. Viability cannot be   established. There is small fluid at the right adnexal region but no   adnexal lesion can be seen. Recommend correlation with serial beta hCG   assessment and imaging as needed. Several uterine fibroids noted.         Report per radiology    Laboratory:  Labs Ordered and Resulted from Time of ED Arrival to Time of ED Departure   BASIC METABOLIC PANEL - Abnormal       Result Value    Sodium 136      Potassium 3.8      Chloride 109      Carbon Dioxide (CO2) 20      Anion Gap 7      Urea Nitrogen 6 (*)     Creatinine 0.63      Calcium 8.5      Glucose 101 (*)     GFR Estimate >90     HCG QUANTITATIVE PREGNANCY - Abnormal    hCG Quantitative 15,888 (*)    CBC WITH PLATELETS AND DIFFERENTIAL - Abnormal    WBC Count 3.7 (*)     RBC " Count 4.17      Hemoglobin 12.6      Hematocrit 36.2      MCV 87      MCH 30.2      MCHC 34.8      RDW 12.7      Platelet Count 210      % Neutrophils 54      % Lymphocytes 36      % Monocytes 8      % Eosinophils 1      % Basophils 1      % Immature Granulocytes 0      NRBCs per 100 WBC 0      Absolute Neutrophils 2.0      Absolute Lymphocytes 1.3      Absolute Monocytes 0.3      Absolute Eosinophils 0.0      Absolute Basophils 0.0      Absolute Immature Granulocytes 0.0      Absolute NRBCs 0.0     INFLUENZA A/B & SARS-COV2 PCR MULTIPLEX - Normal    Influenza A PCR Negative      Influenza B PCR Negative      RSV PCR Negative      SARS CoV2 PCR Negative     ABO/RH TYPE AND SCREEN        Emergency Department Course:             Reviewed:  I reviewed nursing notes, vitals and past medical history    Assessments:  8:52 AM I obtained history and examined the patient as noted above.   10:21 AM I rechecked the patient and explained findings. Headache resolved  11:54 AM I rechecked the patient and explained the findings.  Continued relief from headache      Interventions:  Medications   0.9% sodium chloride BOLUS (1,000 mLs Intravenous New Bag 9/19/22 0900)   metoclopramide (REGLAN) injection 10 mg (10 mg Intravenous Given 9/19/22 0900)   acetaminophen (TYLENOL) tablet 975 mg (975 mg Oral Given 9/19/22 0900)   diphenhydrAMINE (BENADRYL) injection 12.5 mg (12.5 mg Intravenous Given 9/19/22 0900)        Disposition:  The patient was discharged to home.     Impression & Plan    CMS Diagnoses: None      Medical Decision Making:  Fang Rodriguez is a 27 year old female who presents with a headache. She has history of headaches.  I considered a broad differential diagnosis for this patient including tension, migraine, analgesic rebound, occipital neuralgia, etc.  I also considered other less common but serious causes considered included meningitis, encephalitis, subarachnoid bleed, temporal arteritis, stroke, tumor, etc.  She has  no signs of serious headache etiologies at this point.  No advanced imaging is indicated, nor is CT/lumbar puncture for SAH.  Her questions were answered and she feels improved after above interventions in ED.  Supportive outpatient management is therefore indicated.  Headache precautions given for home.      Patient is also very early in pregnancy.  Beta hCG is 15,000.  She has had some what she describes as brown discharge when wiping.  Occasional cramping pain last week but not persisting since then.  Ultrasound with possible early gestational sac but no fetal pole or fetal heartbeat is seen.  This makes it possible for this to be an early miscarriage.  No evidence of a ectopic pregnancy at this time.  I considered a broad differential including ovarian cyst, UTI, pyelonephritis, subchorionic hemorrhage, uterine bleeding, active miscarriage, constipation, etc.  More rare but serious etiologies considered included ectopic pregnancy, appendicitis, cholecystitis, volvulus, intraabdominal abscess, heterotopic pregnancy, etc.  In this patient, there are no signs of serious etiologies of abdominal pain.  Supportive outpatient management is therefore indicated.  Plan is home, close follow-up with OB, threatened miscarriage precautions, and return to ED for worsening pain, heavy vaginal bleeding (more than 1 pad soaked every hour).  Questions were answered.  Patient's previous type and screen revealed O+ blood type therefore she does not need RhoGAM.  Patient understands need for repeat beta-hCG in 2 days and repeat ultrasound in approximately a week to ensure progression of pregnancy.  All patient questions and concerns addressed.        Diagnosis:    ICD-10-CM    1. Acute nonintractable headache, unspecified headache type  R51.9    2. Pregnancy with early threatened   O20.0             2022   Melinda Bunn MD Neuner, Maria Bea, MD  22 1156

## 2023-05-07 ENCOUNTER — HEALTH MAINTENANCE LETTER (OUTPATIENT)
Age: 28
End: 2023-05-07

## 2024-07-14 ENCOUNTER — HEALTH MAINTENANCE LETTER (OUTPATIENT)
Age: 29
End: 2024-07-14

## 2025-07-19 ENCOUNTER — HEALTH MAINTENANCE LETTER (OUTPATIENT)
Age: 30
End: 2025-07-19